# Patient Record
Sex: MALE | Race: BLACK OR AFRICAN AMERICAN
[De-identification: names, ages, dates, MRNs, and addresses within clinical notes are randomized per-mention and may not be internally consistent; named-entity substitution may affect disease eponyms.]

---

## 2018-11-01 ENCOUNTER — HOSPITAL ENCOUNTER (INPATIENT)
Dept: HOSPITAL 61 - ER | Age: 63
LOS: 6 days | Discharge: HOME | DRG: 552 | End: 2018-11-07
Attending: FAMILY MEDICINE | Admitting: FAMILY MEDICINE
Payer: COMMERCIAL

## 2018-11-01 VITALS — SYSTOLIC BLOOD PRESSURE: 156 MMHG | DIASTOLIC BLOOD PRESSURE: 94 MMHG

## 2018-11-01 VITALS — HEIGHT: 70 IN | WEIGHT: 215 LBS | BODY MASS INDEX: 30.78 KG/M2

## 2018-11-01 VITALS — DIASTOLIC BLOOD PRESSURE: 108 MMHG | SYSTOLIC BLOOD PRESSURE: 155 MMHG

## 2018-11-01 DIAGNOSIS — Z88.8: ICD-10-CM

## 2018-11-01 DIAGNOSIS — S76.012A: ICD-10-CM

## 2018-11-01 DIAGNOSIS — N28.1: ICD-10-CM

## 2018-11-01 DIAGNOSIS — M41.9: ICD-10-CM

## 2018-11-01 DIAGNOSIS — E78.5: ICD-10-CM

## 2018-11-01 DIAGNOSIS — K76.89: ICD-10-CM

## 2018-11-01 DIAGNOSIS — Z82.49: ICD-10-CM

## 2018-11-01 DIAGNOSIS — M47.26: Primary | ICD-10-CM

## 2018-11-01 DIAGNOSIS — N18.2: ICD-10-CM

## 2018-11-01 DIAGNOSIS — I12.9: ICD-10-CM

## 2018-11-01 DIAGNOSIS — M70.62: ICD-10-CM

## 2018-11-01 DIAGNOSIS — L10.0: ICD-10-CM

## 2018-11-01 LAB
APTT PPP: YELLOW S
BACTERIA #/AREA URNS HPF: (no result) /HPF
BILIRUB UR QL STRIP: NEGATIVE
FIBRINOGEN PPP-MCNC: CLEAR MG/DL
HYALINE CASTS #/AREA URNS LPF: (no result) /HPF
NITRITE UR QL STRIP: NEGATIVE
PH UR STRIP: 5.5 [PH]
PROT UR STRIP-MCNC: NEGATIVE MG/DL
RBC #/AREA URNS HPF: (no result) /HPF (ref 0–2)
SQUAMOUS #/AREA URNS LPF: (no result) /LPF
UROBILINOGEN UR-MCNC: 1 MG/DL
WBC #/AREA URNS HPF: (no result) /HPF (ref 0–4)

## 2018-11-01 PROCEDURE — 72148 MRI LUMBAR SPINE W/O DYE: CPT

## 2018-11-01 PROCEDURE — 74177 CT ABD & PELVIS W/CONTRAST: CPT

## 2018-11-01 PROCEDURE — 73502 X-RAY EXAM HIP UNI 2-3 VIEWS: CPT

## 2018-11-01 PROCEDURE — 81001 URINALYSIS AUTO W/SCOPE: CPT

## 2018-11-01 PROCEDURE — 96372 THER/PROPH/DIAG INJ SC/IM: CPT

## 2018-11-01 PROCEDURE — 85651 RBC SED RATE NONAUTOMATED: CPT

## 2018-11-01 PROCEDURE — 62323 NJX INTERLAMINAR LMBR/SAC: CPT

## 2018-11-01 PROCEDURE — 80048 BASIC METABOLIC PNL TOTAL CA: CPT

## 2018-11-01 PROCEDURE — 85025 COMPLETE CBC W/AUTO DIFF WBC: CPT

## 2018-11-01 PROCEDURE — 72100 X-RAY EXAM L-S SPINE 2/3 VWS: CPT

## 2018-11-01 PROCEDURE — 36415 COLL VENOUS BLD VENIPUNCTURE: CPT

## 2018-11-01 NOTE — PHYS DOC
Adult General


Chief Complaint


Chief Complaint:  BACK PAIN - NO INJURY





HPI


HPI





Patient is a 63  year old male who presents complaining of 10 out of 10 left 

groin pain radiating into the left lower back that began one week ago. Patient 

states the pain is worse on certain sitting positions and movement. Patient 

states he tried taking over-the-counter pain relievers and hydrocodone but did 

not obtain any relief so he stopped taking them. Patient denies any injury. 

Denies any loss of bowel /bladder function. Denies any urgency frequency 

dysuria or hematuria.





PCP Dr. Mannie Wesley.





Review of Systems


Review of Systems





Constitutional: Denies fever or chills []


Eyes: Denies change in visual acuity, redness, or eye pain []


HENT: Denies nasal congestion or sore throat []


Respiratory: Denies cough or shortness of breath []


Cardiovascular: No additional information not addressed in HPI []


GI: Denies abdominal pain, nausea, vomiting, bloody stools or diarrhea []


: Denies dysuria or hematuria []


Musculoskeletal: Reports left groin pain radiating into the left buttock


Integument: Denies rash or skin lesions []


Neurologic: Denies headache, focal weakness or sensory changes []








All other systems were reviewed and found to be within normal limits, except as 

documented in this note.





Current Medications


Current Medications





Current Medications








 Medications


  (Trade)  Dose


 Ordered  Sig/Lukasz  Start Time


 Stop Time Status Last Admin


Dose Admin


 


 Clonidine HCl


  (Catapres)  0.1 mg  Q6HRS  PRN  11/1/18 16:30


   UNV  





 


 Dexamethasone


 Sodium Phosphate


  (Decadron)  10 mg  1X  ONCE  11/1/18 14:15


 11/1/18 14:20 DC 11/1/18 14:29


10 MG


 


 Diazepam


  (Valium)  5 mg  1X  ONCE  11/1/18 14:15


 11/1/18 14:20 DC 11/1/18 14:34


5 MG


 


 Ketorolac


 Tromethamine


  (Toradol Im)  60 mg  1X  ONCE  11/1/18 14:15


 11/1/18 14:20 DC 11/1/18 14:31


60 MG


 


 Morphine Sulfate


  (Morphine


 Sulfate)  4 mg  PRN Q2HR  PRN  11/1/18 16:30


 11/2/18 16:29 UNV  





 


 Ondansetron HCl


  (Zofran)  4 mg  PRN Q8HRS  PRN  11/1/18 16:30


 11/2/18 16:29 UNV  














Allergies


Allergies





Allergies








Coded Allergies Type Severity Reaction Last Updated Verified


 


  lisinopril Allergy Severe SWELLING 11/1/18 Yes











Physical Exam


Physical Exam





Constitutional: Well developed, well nourished, no acute distress, non-toxic 

appearance. []


HENT: Normocephalic, atraumatic, bilateral external ears normal, oropharynx 

moist, no oral exudates, nose normal. []


Eyes: PERRLA, EOMI, conjunctiva normal, no discharge. [] 


Neck: Normal range of motion, no tenderness, supple, no stridor. [] 


Cardiovascular:Heart rate regular rhythm, no murmur []


Lungs & Thorax:  Bilateral breath sounds clear to auscultation []


Abdomen: Bowel sounds normal, soft, no tenderness, no masses, no pulsatile 

masses. [] 


Skin: Warm, dry, no erythema, no rash. [] 


Back: No tenderness, no CVA tenderness. [] 


Extremities: Tenderness on palpation of the left SI joint, no midline lumbar 

spine tenderness, no cyanosis, no clubbing, ROM intact, no edema. [] 


Neurologic: Alert and oriented X 3, normal motor function, normal sensory 

function, no focal deficits noted. []


Psychologic: Affect normal, judgement normal, mood normal. []





Current Patient Data


Vital Signs





 Vital Signs








  Date Time  Temp Pulse Resp B/P (MAP) Pulse Ox O2 Delivery O2 Flow Rate FiO2


 


11/1/18 15:58    154/87 (109)    


 


11/1/18 14:17 97.6 102 18  97 Room Air  





 97.6       











EKG


EKG


[]





Radiology/Procedures


Radiology/Procedures


[]PROCEDURE: LUMBAR SPINE 2-3V





LUMBAR SPINE 2-3V


 


History: LOWER BACK PAIN/ LEFT SIDED PELVIC PAIN.


 


Comparison: July 5, 2017


 


Right convexity thoracolumbar scoliosis is again identified. Appears 


similar to the previous study. Degenerative spondylosis with marginal 


spurring at multiple levels. This also appears similar to the previous 


exam. No significant spondylolisthesis. No evidence of vertebral body 


compression fracture


 


IMPRESSION: Lumbar spondylosis with scoliosis.


 


Electronically signed by: Ashok Cai MD (11/1/2018 3:14 PM) [facility]














DICTATED and SIGNED BY:     ASHOK CAI MD


DATE:     11/01/18 1510





Course & Med Decision Making


Course & Med Decision Making


Pertinent Labs and Imaging studies reviewed. (See chart for details)





This is a 63-year-old male patient presenting to the ED today with left groin 

pain radiating to the left buttock one week. Lumbar spine x-rays interpreted by 

radiologist were noted for spondylosis with the scoliosis, no acute findings. 

Patient was given morphine 10 mg subcutaneous, Valium, Toradol, Decadron, he 

states he has not obtained any relief, he is requesting to be admitted. He had 

a very hard time ambulating from wheel chair to cart. He was holding on 

anything he can to keep him up.  Blood pressure 201/106 in the ED with a heart 

rate of low 100s. Patient was given clonidine. Patient denies any cardiac or 

neurological symptoms. Has history of hypertension. Did not take his 

medications today.





Spoke with Dr. Waters who accepted patient for admission.





Dragon Disclaimer


Dragon Disclaimer


This electronic medical record was generated, in whole or in part, using a 

voice recognition dictation system.





Departure


Departure


Impression:  


 Primary Impression:  


 Intractable low back pain


 Additional Impression:  


 Accelerated hypertension


Disposition:  09 ADMITTED AS INPATIENT


Condition:  STABLE


Referrals:  


ALVIN JORGENSEN (PCP)





Problem Qualifiers











LUIS MARTIN APRN Nov 1, 2018 14:20

## 2018-11-01 NOTE — RAD
LUMBAR SPINE 2-3V

 

History: LOWER BACK PAIN/ LEFT SIDED PELVIC PAIN.

 

Comparison: July 5, 2017

 

Right convexity thoracolumbar scoliosis is again identified. Appears 

similar to the previous study. Degenerative spondylosis with marginal 

spurring at multiple levels. This also appears similar to the previous 

exam. No significant spondylolisthesis. No evidence of vertebral body 

compression fracture

 

IMPRESSION: Lumbar spondylosis with scoliosis.

 

Electronically signed by: Ashok Cai MD (11/1/2018 3:14 PM) [facility]

## 2018-11-02 VITALS — DIASTOLIC BLOOD PRESSURE: 106 MMHG | SYSTOLIC BLOOD PRESSURE: 170 MMHG

## 2018-11-02 VITALS — SYSTOLIC BLOOD PRESSURE: 160 MMHG | DIASTOLIC BLOOD PRESSURE: 98 MMHG

## 2018-11-02 VITALS — DIASTOLIC BLOOD PRESSURE: 97 MMHG | SYSTOLIC BLOOD PRESSURE: 150 MMHG

## 2018-11-02 VITALS — SYSTOLIC BLOOD PRESSURE: 161 MMHG | DIASTOLIC BLOOD PRESSURE: 99 MMHG

## 2018-11-02 VITALS — SYSTOLIC BLOOD PRESSURE: 146 MMHG | DIASTOLIC BLOOD PRESSURE: 98 MMHG

## 2018-11-02 VITALS — SYSTOLIC BLOOD PRESSURE: 156 MMHG | DIASTOLIC BLOOD PRESSURE: 96 MMHG

## 2018-11-02 LAB
ANION GAP SERPL CALC-SCNC: 12 MMOL/L (ref 6–14)
BASOPHILS # BLD AUTO: 0 X10^3/UL (ref 0–0.2)
BASOPHILS NFR BLD: 0 % (ref 0–3)
BUN SERPL-MCNC: 18 MG/DL (ref 8–26)
CALCIUM SERPL-MCNC: 8.8 MG/DL (ref 8.5–10.1)
CHLORIDE SERPL-SCNC: 103 MMOL/L (ref 98–107)
CO2 SERPL-SCNC: 24 MMOL/L (ref 21–32)
CREAT SERPL-MCNC: 1.3 MG/DL (ref 0.7–1.3)
EOSINOPHIL NFR BLD: 0 % (ref 0–3)
EOSINOPHIL NFR BLD: 0 X10^3/UL (ref 0–0.7)
ERYTHROCYTE [DISTWIDTH] IN BLOOD BY AUTOMATED COUNT: 14.9 % (ref 11.5–14.5)
GFR SERPLBLD BASED ON 1.73 SQ M-ARVRAT: 67.7 ML/MIN
GLUCOSE SERPL-MCNC: 112 MG/DL (ref 70–99)
HCT VFR BLD CALC: 43.2 % (ref 39–53)
HGB BLD-MCNC: 15.2 G/DL (ref 13–17.5)
LYMPHOCYTES # BLD: 1.1 X10^3/UL (ref 1–4.8)
LYMPHOCYTES NFR BLD AUTO: 17 % (ref 24–48)
MCH RBC QN AUTO: 30 PG (ref 25–35)
MCHC RBC AUTO-ENTMCNC: 35 G/DL (ref 31–37)
MCV RBC AUTO: 84 FL (ref 79–100)
MONO #: 0.5 X10^3/UL (ref 0–1.1)
MONOCYTES NFR BLD: 8 % (ref 0–9)
NEUT #: 4.9 X10^3UL (ref 1.8–7.7)
NEUTROPHILS NFR BLD AUTO: 75 % (ref 31–73)
PLATELET # BLD AUTO: 310 X10^3/UL (ref 140–400)
POTASSIUM SERPL-SCNC: 3.5 MMOL/L (ref 3.5–5.1)
RBC # BLD AUTO: 5.15 X10^6/UL (ref 4.3–5.7)
SODIUM SERPL-SCNC: 139 MMOL/L (ref 136–145)
WBC # BLD AUTO: 6.6 X10^3/UL (ref 4–11)

## 2018-11-02 RX ADMIN — LOSARTAN POTASSIUM SCH MG: 50 TABLET ORAL at 08:52

## 2018-11-02 RX ADMIN — HYDROCODONE BITARTRATE AND ACETAMINOPHEN PRN TAB: 5; 325 TABLET ORAL at 10:53

## 2018-11-02 RX ADMIN — HYDROCODONE BITARTRATE AND ACETAMINOPHEN PRN TAB: 5; 325 TABLET ORAL at 20:14

## 2018-11-02 RX ADMIN — KETOROLAC TROMETHAMINE PRN MG: 30 INJECTION, SOLUTION INTRAMUSCULAR at 08:52

## 2018-11-02 RX ADMIN — KETOROLAC TROMETHAMINE PRN MG: 30 INJECTION, SOLUTION INTRAMUSCULAR at 16:44

## 2018-11-02 NOTE — PDOC1
H & P


H&P


HPI:


Mr. Rouse is a 63-year-old male with past medical history of hypertension, 

hyperlipidemia, chronic kidney disease stage 2, pemphigus vulgaris, erectile 

dysfunction who presented to the ER yesterday for lower pelvic pain that 

radiates to the left buttock that is intractable and was unable to be 

controlled with pain medication in the emergency room. Of note, the ER 

performed only a lumbar spine x-ray, and did not evaluate anything related to 

his abdomen or pelvis. Labs including a UA were fairly unremarkable. He reports 

this pain has occurred before in 2015 and spontaneously resolved after a 

thorough workup that was unremarkable per his report. He reports this pain 

recurred approximately a week ago with increasing pain over the last couple of 

days. During his last episode he had been doing stretching exercises for 

possible psoas muscle spasm which had helped that time, but were unhelpful at 

this time. He denies fever, chills, nausea, vomiting, diarrhea, constipation, 

melena, hematochezia. He denies penile pain or testicular pain. He denies 

hernia now or in the past. He denies penile discharge, dysuria, frequency, 

urgency.





ROS:


Complete review systems is negative apart from otherwise stated above.





PMH: As above


Family Hx: Father had congestive heart failure, heart disease, MI, 

hypertension. Mother had congestive heart failure, diabetes, chronic kidney 

disease.


Social Hx: Nonsmoker, no significant alcohol use. No drug use.


Surg Hx: Tonsillectomy


Meds: Reviewed and reconciled


Allergies: Reviewed





PE:


Alert, oriented, moderate distress due to pain


EOMI, sclera non-icteric


Neck supple


RRR, no murmur


CTAB, no wheezes, crackles or rhonchi


Soft, NT, ND, normal bowel sounds, no rebound, guarding. Pain is reproduced 

with palpation of the left lower pelvic region and just superior to penis. No 

hernia appreciated.


No edema, cyanosis. Normal capillary refill.


Calm, cooperative, mood/affect within normal limits





Assessment/Plan:


Left lower pelvic pain


Hypertension


Hyperlipidemia


Chronic kidney disease stage II


Pemphigus vulgaris





Cancel lumbar spine MRI as ordered per ED.


Obtain CT abdomen and pelvis with contrast and urology consultation.


Home meds continued


Pain control











RAMIRO PATIÑO MD Nov 2, 2018 08:11

## 2018-11-02 NOTE — PDOC2
KATHERINMARCY MAGGIE APRN 11/2/18 1115:


UROLOGY CONSULT


Date of Consult


Date of Consult


DATE: 11/2/18 


TIME: 11:07





Reason for Consult


Reason for Consult:


Left groin pain radiating into pelvic/mons pubis area





Referring Physician


Referring Physician:


Dr. Waters





Identification/Chief Complaint


Chief Complaint


Left groin pain radiating into pelvic/mons pubis area





Source


Source:  Caregiver, Chart review, Patient





History of Present Illness


Reason for Visit:


Patient is a 63 year old male admitted for extreme pain in the left flank/groin 

area which radiates into his left hip, lower back area.  The pain is not over 

his kidneys at all, and  he denies any hematuria, dysuria LUTS/nocturia or 

history of prostate problems or cancer. He also denies a history of kidney 

problems, cancer or kidney stones ever.  He describers the pain as almost 

originating on the inside of his left thigh, near his pubic bone and radiating 

up into his hip joint. He denies any penile or scrotal pain or swelling and his 

urination is normal. He has been seen by Ortho in the past for this pain, but 

cannot remember what they told him. His wife is at bedside and they relate a 

history of a few different scans in the past two years that did not give them 

any definitive answers.  She has records of  a PSA less than 1 in the last year 

and is requesting FNP do a prostate exam for him.





Past Medical History


Musculoskeletal:   low back pain, Other (left hip pain, left groin pain. )


Renal/:  No pertinent hx





Current Medications


Current Medications





Current Medications


Acetaminophen/ Hydrocodone Bitart (Lortab 5/325) 1 tab PRN Q6HRS  PRN PO PAIN 

Last administered on 11/2/18at 10:53;  Start 11/2/18 at 08:45


Amlodipine Besylate (Norvasc) 5 mg DAILY PO  Last administered on 11/2/18at 08:

52;  Start 11/2/18 at 09:00


Clonidine HCl (Catapres) 0.1 mg PRN Q6HRS  PRN PO HYPERTENSION, SEE COMMENTS 

Last administered on 11/1/18at 22:48;  Start 11/1/18 at 16:30;  Stop 11/2/18 at 

08:41;  Status DC


Clonidine HCl (Catapres) 0.2 mg 1X  ONCE PO ;  Start 11/1/18 at 16:30;  Stop 11/ 1/18 at 16:39;  Status DC


Dexamethasone Sodium Phosphate (Decadron) 10 mg 1X  ONCE IM  Last administered 

on 11/1/18at 14:29;  Start 11/1/18 at 14:15;  Stop 11/1/18 at 14:20;  Status DC


Diazepam (Valium) 5 mg 1X  ONCE PO  Last administered on 11/1/18at 14:34;  

Start 11/1/18 at 14:15;  Stop 11/1/18 at 14:20;  Status DC


Hydrochlorothiazide (Microzide) 12.5 mg DAILY PO  Last administered on 11/2/ 18at 08:52;  Start 11/2/18 at 09:00


Info (CONTRAST GIVEN -- Rx MONITORING) 1 each PRN DAILY  PRN MC SEE COMMENTS;  

Start 11/2/18 at 08:45;  Stop 11/4/18 at 08:44


Iohexol (Omnipaque 240 Mg/ml) 30 ml 1X  ONCE PO  Last administered on 11/2/18at 

08:45;  Start 11/2/18 at 08:45;  Stop 11/2/18 at 08:46;  Status DC


Iohexol (Omnipaque 300 Mg/ml) 75 ml 1X  ONCE IV  Last administered on 11/2/18at 

08:45;  Start 11/2/18 at 08:45;  Stop 11/2/18 at 08:46;  Status DC


Ketorolac Tromethamine (Toradol 30mg Vial) 15 mg PRN Q6HRS  PRN IV PAIN Last 

administered on 11/2/18at 08:52;  Start 11/2/18 at 08:45;  Stop 11/7/18 at 08:44


Ketorolac Tromethamine (Toradol Im) 60 mg 1X  ONCE IM  Last administered on 11/1 /18at 14:31;  Start 11/1/18 at 14:15;  Stop 11/1/18 at 14:20;  Status DC


Losartan Potassium (Cozaar) 100 mg DAILY PO  Last administered on 11/2/18at 08:

52;  Start 11/2/18 at 09:00


Morphine Sulfate (Morphine Sulfate) 4 mg PRN Q2HR  PRN IV PAIN Last 

administered on 11/2/18at 06:49;  Start 11/1/18 at 16:30;  Stop 11/2/18 at 08:41

;  Status DC


Morphine Sulfate (Morphine Sulfate) 10 mg 1X  ONCE SQ  Last administered on 11/1 /18at 14:32;  Start 11/1/18 at 14:15;  Stop 11/1/18 at 14:20;  Status DC


Ondansetron HCl (Zofran) 4 mg PRN Q8HRS  PRN IV NAUSEA/VOMITING;  Start 11/1/18 

at 16:30;  Stop 11/2/18 at 16:29


Prednisone (Prednisone) 10 mg DAILY PO ;  Start 11/2/18 at 09:00





Allergies


Allergies:  


Coded Allergies:  


     lisinopril (Verified  Allergy, Severe, SWELLING, 11/1/18)





ROS


Review Of Systems:


CONSTITUTIONAL:        No fever or chills


EYES:                          No recent changes


SKIN:               No rash or itching


CARDIOVASCULAR:     No chest pain, syncope, palpitations, or edema


RESPIRATORY:            No SOB or cough


GASTROINTESTINAL:    No nausea, vomiting or abdominal pain


NEUROLOGICAL:          No headaches or weakness


ENDOCRINE:               No cold or heat intolerance


GENITOURINARY:         No urgency or frequency of urination, left going pain, 

no private area pain


MUSCULOSKELETAL:   + back pain, joint pain over left hip. 


LYMPHATICS:               No enlarged lymph nodes


PSYCHIATRIC:              No anxiety or depression





Physical Exam


Physical Exam:


General: Pleasant, no acute distress, well groomed


Eyes: conjunctiva anicteric, eyes full range of motion


ENT: moist oral mucosa, normal dentition


Neck: Trachea midline, no masses


Respiratory: unlabored breathing, not using accessory muscles


Abdomen: nontender, nondistended, no hepatosplenomegaly, no masses


NATALI: Prostate about 40 grams, WNL. 


Pelvic: scrotal WNL, non tender on exam.  scrotal contents WNL. Phallus WNL, No 

inguinal hernia





Vitals


VITALS





Vital Signs








  Date Time  Temp Pulse Resp B/P (MAP) Pulse Ox O2 Delivery O2 Flow Rate FiO2


 


11/2/18 10:53     96 Room Air  


 


11/2/18 08:52  77  156/96    


 


11/2/18 07:00 98.8  16     





 98.8       











Labs


Labs





Laboratory Tests








Test


 11/1/18


20:00 11/2/18


06:40


 


Urine Collection Type Unknown  


 


Urine Color Yellow  


 


Urine Clarity Clear  


 


Urine pH 5.5  


 


Urine Specific Gravity 1.015  


 


Urine Protein


 Negative mg/dL


(NEG-TRACE) 





 


Urine Glucose (UA)


 Negative mg/dL


(NEG) 





 


Urine Ketones (Stick) 40 mg/dL (NEG)  


 


Urine Blood Negative (NEG)  


 


Urine Nitrite Negative (NEG)  


 


Urine Bilirubin Negative (NEG)  


 


Urine Urobilinogen Dipstick


 1.0 mg/dL (0.2


mg/dL) 





 


Urine Leukocyte Esterase Negative (NEG)  


 


Urine RBC Occ /HPF (0-2)  


 


Urine WBC 1-4 /HPF (0-4)  


 


Urine Squamous Epithelial


Cells Few /LPF 


 





 


Urine Bacteria


 Few /HPF


(0-FEW) 





 


Urine Hyaline Casts Moderate /HPF  


 


Urine Mucus Marked /LPF  


 


White Blood Count


 


 6.6 x10^3/uL


(4.0-11.0)


 


Red Blood Count


 


 5.15 x10^6/uL


(4.30-5.70)


 


Hemoglobin


 


 15.2 g/dL


(13.0-17.5)


 


Hematocrit


 


 43.2 %


(39.0-53.0)


 


Mean Corpuscular Volume  84 fL () 


 


Mean Corpuscular Hemoglobin  30 pg (25-35) 


 


Mean Corpuscular Hemoglobin


Concent 


 35 g/dL


(31-37)


 


Red Cell Distribution Width


 


 14.9 %


(11.5-14.5)


 


Platelet Count


 


 310 x10^3/uL


(140-400)


 


Neutrophils (%) (Auto)  75 % (31-73) 


 


Lymphocytes (%) (Auto)  17 % (24-48) 


 


Monocytes (%) (Auto)  8 % (0-9) 


 


Eosinophils (%) (Auto)  0 % (0-3) 


 


Basophils (%) (Auto)  0 % (0-3) 


 


Neutrophils # (Auto)


 


 4.9 x10^3uL


(1.8-7.7)


 


Lymphocytes # (Auto)


 


 1.1 x10^3/uL


(1.0-4.8)


 


Monocytes # (Auto)


 


 0.5 x10^3/uL


(0.0-1.1)


 


Eosinophils # (Auto)


 


 0.0 x10^3/uL


(0.0-0.7)


 


Basophils # (Auto)


 


 0.0 x10^3/uL


(0.0-0.2)


 


Sodium Level


 


 139 mmol/L


(136-145)


 


Potassium Level


 


 3.5 mmol/L


(3.5-5.1)


 


Chloride Level


 


 103 mmol/L


()


 


Carbon Dioxide Level


 


 24 mmol/L


(21-32)


 


Anion Gap  12 (6-14) 


 


Blood Urea Nitrogen


 


 18 mg/dL


(8-26)


 


Creatinine


 


 1.3 mg/dL


(0.7-1.3)


 


Estimated GFR


(Cockcroft-Gault) 


 67.7 





 


Glucose Level


 


 112 mg/dL


(70-99)


 


Calcium Level


 


 8.8 mg/dL


(8.5-10.1)








Laboratory Tests








Test


 11/1/18


20:00 11/2/18


06:40


 


Urine Collection Type Unknown  


 


Urine Color Yellow  


 


Urine Clarity Clear  


 


Urine pH 5.5  


 


Urine Specific Gravity 1.015  


 


Urine Protein


 Negative mg/dL


(NEG-TRACE) 





 


Urine Glucose (UA)


 Negative mg/dL


(NEG) 





 


Urine Ketones (Stick) 40 mg/dL (NEG)  


 


Urine Blood Negative (NEG)  


 


Urine Nitrite Negative (NEG)  


 


Urine Bilirubin Negative (NEG)  


 


Urine Urobilinogen Dipstick


 1.0 mg/dL (0.2


mg/dL) 





 


Urine Leukocyte Esterase Negative (NEG)  


 


Urine RBC Occ /HPF (0-2)  


 


Urine WBC 1-4 /HPF (0-4)  


 


Urine Squamous Epithelial


Cells Few /LPF 


 





 


Urine Bacteria


 Few /HPF


(0-FEW) 





 


Urine Hyaline Casts Moderate /HPF  


 


Urine Mucus Marked /LPF  


 


White Blood Count


 


 6.6 x10^3/uL


(4.0-11.0)


 


Red Blood Count


 


 5.15 x10^6/uL


(4.30-5.70)


 


Hemoglobin


 


 15.2 g/dL


(13.0-17.5)


 


Hematocrit


 


 43.2 %


(39.0-53.0)


 


Mean Corpuscular Volume  84 fL () 


 


Mean Corpuscular Hemoglobin  30 pg (25-35) 


 


Mean Corpuscular Hemoglobin


Concent 


 35 g/dL


(31-37)


 


Red Cell Distribution Width


 


 14.9 %


(11.5-14.5)


 


Platelet Count


 


 310 x10^3/uL


(140-400)


 


Neutrophils (%) (Auto)  75 % (31-73) 


 


Lymphocytes (%) (Auto)  17 % (24-48) 


 


Monocytes (%) (Auto)  8 % (0-9) 


 


Eosinophils (%) (Auto)  0 % (0-3) 


 


Basophils (%) (Auto)  0 % (0-3) 


 


Neutrophils # (Auto)


 


 4.9 x10^3uL


(1.8-7.7)


 


Lymphocytes # (Auto)


 


 1.1 x10^3/uL


(1.0-4.8)


 


Monocytes # (Auto)


 


 0.5 x10^3/uL


(0.0-1.1)


 


Eosinophils # (Auto)


 


 0.0 x10^3/uL


(0.0-0.7)


 


Basophils # (Auto)


 


 0.0 x10^3/uL


(0.0-0.2)


 


Sodium Level


 


 139 mmol/L


(136-145)


 


Potassium Level


 


 3.5 mmol/L


(3.5-5.1)


 


Chloride Level


 


 103 mmol/L


()


 


Carbon Dioxide Level


 


 24 mmol/L


(21-32)


 


Anion Gap  12 (6-14) 


 


Blood Urea Nitrogen


 


 18 mg/dL


(8-26)


 


Creatinine


 


 1.3 mg/dL


(0.7-1.3)


 


Estimated GFR


(Cockcroft-Gault) 


 67.7 





 


Glucose Level


 


 112 mg/dL


(70-99)


 


Calcium Level


 


 8.8 mg/dL


(8.5-10.1)











Assessment/Plan


Assessment/Plan


PSA less than 1 per record patient brought in, NATALI WNL and not overly 

uncomfortable during the process per patient report. 


A CT AB/PELVIS has been done on patient but read is not back yet.  Await 

official read from Radiology. 


WBC 6.6, 


CRT 1.3, BUN 18


Urine shows few bacteria with no blood, leukocytes or nitrites.  Low suspicion 

for infection. 


Pt afebrile





LUPE ÁLVAREZ MD 11/3/18 1406:


UROLOGY CONSULT


Assessment/Plan


Assessment/Plan


Debilitating pinpoint tenderness superior and left lateral to the base of his 

penis that radiates to the left hip.  Chronic in nature however, acutely worse 

for the past 8 days.  Sx come and go.  Finds relief with abducting his left 

thigh.  UA normal.  No urinary tract pathology on CT. 


Exam and history are not consistent with pain from  pathology.  Likely 

musculoskeletal in nature.  May benefit from pelvic MRI and/or pain management 

service for consideration of regional nerve block.


Will sign-off.  Please call with questions.











MARCY PANDEY Nov 2, 2018 11:15


LUPE ÁLVAREZ MD Nov 3, 2018 14:06

## 2018-11-03 VITALS — DIASTOLIC BLOOD PRESSURE: 59 MMHG | SYSTOLIC BLOOD PRESSURE: 161 MMHG

## 2018-11-03 VITALS — DIASTOLIC BLOOD PRESSURE: 97 MMHG | SYSTOLIC BLOOD PRESSURE: 142 MMHG

## 2018-11-03 VITALS — SYSTOLIC BLOOD PRESSURE: 165 MMHG | DIASTOLIC BLOOD PRESSURE: 107 MMHG

## 2018-11-03 VITALS — SYSTOLIC BLOOD PRESSURE: 146 MMHG | DIASTOLIC BLOOD PRESSURE: 103 MMHG

## 2018-11-03 VITALS — DIASTOLIC BLOOD PRESSURE: 99 MMHG | SYSTOLIC BLOOD PRESSURE: 157 MMHG

## 2018-11-03 VITALS — SYSTOLIC BLOOD PRESSURE: 159 MMHG | DIASTOLIC BLOOD PRESSURE: 102 MMHG

## 2018-11-03 RX ADMIN — LOSARTAN POTASSIUM SCH MG: 50 TABLET ORAL at 08:36

## 2018-11-03 RX ADMIN — HYDROCODONE BITARTRATE AND ACETAMINOPHEN PRN TAB: 5; 325 TABLET ORAL at 08:37

## 2018-11-03 RX ADMIN — KETOROLAC TROMETHAMINE PRN MG: 30 INJECTION, SOLUTION INTRAMUSCULAR at 13:54

## 2018-11-03 RX ADMIN — HYDROCODONE BITARTRATE AND ACETAMINOPHEN PRN TAB: 5; 325 TABLET ORAL at 19:23

## 2018-11-04 VITALS — DIASTOLIC BLOOD PRESSURE: 101 MMHG | SYSTOLIC BLOOD PRESSURE: 152 MMHG

## 2018-11-04 VITALS — SYSTOLIC BLOOD PRESSURE: 171 MMHG | DIASTOLIC BLOOD PRESSURE: 110 MMHG

## 2018-11-04 VITALS — SYSTOLIC BLOOD PRESSURE: 171 MMHG | DIASTOLIC BLOOD PRESSURE: 97 MMHG

## 2018-11-04 VITALS — SYSTOLIC BLOOD PRESSURE: 158 MMHG | DIASTOLIC BLOOD PRESSURE: 99 MMHG

## 2018-11-04 VITALS — SYSTOLIC BLOOD PRESSURE: 162 MMHG | DIASTOLIC BLOOD PRESSURE: 99 MMHG

## 2018-11-04 VITALS — DIASTOLIC BLOOD PRESSURE: 117 MMHG | SYSTOLIC BLOOD PRESSURE: 186 MMHG

## 2018-11-04 RX ADMIN — CELECOXIB SCH MG: 100 CAPSULE ORAL at 11:53

## 2018-11-04 RX ADMIN — CELECOXIB SCH MG: 100 CAPSULE ORAL at 20:20

## 2018-11-04 RX ADMIN — LOSARTAN POTASSIUM SCH MG: 50 TABLET ORAL at 09:35

## 2018-11-04 RX ADMIN — KETOROLAC TROMETHAMINE PRN MG: 30 INJECTION, SOLUTION INTRAMUSCULAR at 01:43

## 2018-11-05 VITALS — DIASTOLIC BLOOD PRESSURE: 98 MMHG | SYSTOLIC BLOOD PRESSURE: 170 MMHG

## 2018-11-05 VITALS — DIASTOLIC BLOOD PRESSURE: 101 MMHG | SYSTOLIC BLOOD PRESSURE: 160 MMHG

## 2018-11-05 VITALS — SYSTOLIC BLOOD PRESSURE: 174 MMHG | DIASTOLIC BLOOD PRESSURE: 89 MMHG

## 2018-11-05 VITALS — SYSTOLIC BLOOD PRESSURE: 158 MMHG | DIASTOLIC BLOOD PRESSURE: 97 MMHG

## 2018-11-05 VITALS — SYSTOLIC BLOOD PRESSURE: 173 MMHG | DIASTOLIC BLOOD PRESSURE: 99 MMHG

## 2018-11-05 VITALS — DIASTOLIC BLOOD PRESSURE: 95 MMHG | SYSTOLIC BLOOD PRESSURE: 163 MMHG

## 2018-11-05 VITALS — SYSTOLIC BLOOD PRESSURE: 195 MMHG | DIASTOLIC BLOOD PRESSURE: 109 MMHG

## 2018-11-05 RX ADMIN — LIDOCAINE SCH PATCH: 50 PATCH CUTANEOUS at 18:10

## 2018-11-05 RX ADMIN — GABAPENTIN SCH MG: 100 CAPSULE ORAL at 14:32

## 2018-11-05 RX ADMIN — SENNOSIDES AND DOCUSATE SODIUM SCH TAB: 8.6; 5 TABLET ORAL at 21:00

## 2018-11-05 RX ADMIN — CELECOXIB SCH MG: 100 CAPSULE ORAL at 08:16

## 2018-11-05 RX ADMIN — LOSARTAN POTASSIUM SCH MG: 50 TABLET ORAL at 08:18

## 2018-11-05 RX ADMIN — GABAPENTIN SCH MG: 100 CAPSULE ORAL at 11:05

## 2018-11-05 NOTE — CONS
DATE OF CONSULTATION:  11/05/2018



I saw him at the request of Dr. Brennan on 11/05/2018.



ATTENDING PHYSICIAN:  Dr. Waters.



LOCATION:  He is in room 569.



HISTORY OF PRESENT ILLNESS:  This is a 63-year-old retired .  The

patient with history of hypertension, hyperlipidemia, chronic kidney disease

stage 2, pemphigus vulgaris, erectile dysfunction, admitted through the

Emergency Room on 11/03/2018 for left pelvic area pain radiating to his left

buttock and to his lower back, unable to control with pain medication.  He had

x-rays of lumbar spine in the Emergency Room, which revealed mild spondylosis

and scoliosis.  The patient had CT scan of the pelvis, which revealed probable

small hepatic cyst, small left renal cyst, minimal sigmoid diverticulosis, small

fat containing umbilical hernia.  No acute abdominal or pelvic abnormality was

noted.  It revealed moderate thoracic-lumbar scoliosis with moderate multilevel

degenerative changes.  The patient admits that pain started about 10-11 days ago

without any specific injury and is not getting any better.  The patient denies

any specific injury.  He denies any radiation of pain to the extremities or any

tingling and numbness sensation in the extremities or any weakness and also

denies any trouble with his bowel or bladder control.



FAMILY HISTORY:  Congestive heart failure with father, heart disease, myocardial

infarction, hypertension.  Mother had congestive heart failure, diabetes and

chronic kidney disease.



PAST SURGICAL HISTORY:  The patient is status post tonsillectomy.



ALLERGIES:  HE IS KNOWN ALLERGIC TO LISINOPRIL.



PHYSICAL EXAMINATION:  Today revealed a middle-aged male.  He is alert, in

moderate distress.  He is protecting his left hip pelvic area.  He had pain free

range of motion of left hip joint.  He had tenderness to palpation over left

sacroiliac joint area, gluteal muscles and trochanteric bursa.  Straight leg

raising test is negative bilaterally.  He is having pain while rolling from side

to side.  The patient had equal perception of touch and pinprick sensation

bilaterally.  He had 5/5 grade muscle strength overall.  Deep tendon reflexes

are 2+ and symmetrical.  He had multiple skin lesions from his pemphigus

vulgaris.



ASSESSMENT:  Left buttock and sacroiliac joint area pain, most probably from

degenerative disk disease.  No clinical evidence of lumbar radiculopathy.  Also,

present with left gluteal muscle strain and left trochanteric bursitis.



RECOMMENDATIONS:  To proceed with injecting painful left sacroiliac joint area

and left trochanteric bursa and to obtain MRI scan of his lumbar vertebrae.  To

ask Physical Therapy to try physical modalities and home when medically stable

with outpatient followup.  To start him back on prednisone by mouth.



Dr. Brennan, I appreciate asking me to participate in the care of this interesting

patient.  I will be glad to follow him with you as needed for the

rehabilitation.

 



______________________________

BRYANNA UGTIÉRREZ MD



DR:  ESTELITA/nts  JOB#:  [id number] / [id number]

DD:  11/05/2018 08:05  DT:  11/05/2018 12:28

## 2018-11-05 NOTE — RAD
Pelvis with left hip, 3 views, 11/5/2018:

 

HISTORY: Hip pain

 

No fracture or dislocation is identified. The hip joints are 

well-maintained. Degenerative changes are evident in the lumbar spine. 

There is contrast material in the colon from a recent CT study.

 

IMPRESSION: No significant left hip abnormality is detected.

 

Electronically signed by: Rick Moritz, MD (11/5/2018 12:54 PM) [facility]

## 2018-11-05 NOTE — RAD
MRI of the lumbar spine without contrast 11/5/2018

 

CLINICAL HISTORY: Low back pain with bilateral leg weakness for 9 days.

 

TECHNIQUE: Unenhanced T1-weighted and T2-weighted sagittal and axial and 

inversion recovery sagittal images of the lumbar spine were obtained.

 

FINDINGS: Comparison is made to radiographs of the lumbar spine dated 

11/1/2018.

 

Moderate S-shaped curvature of the thoracolumbar spine is seen. 

Degenerative signal changes are seen involving the L1-2, L2-3, L3-4 and 

L4-5 discs. Degenerative signal changes are seen within the marrow 

surrounding these discs. The conus medullaris is normal in position and 

signal characteristics. Rounded high signal intensity lesions are seen on 

the T2-weighted images involving both kidneys, left greater than right. 

These measure 3 mm in 2.4 cm in size. They likely represent cysts.

 

At the L1-2 disc space there is a mild generalized disc bulge. This is 

eccentric to the left. Degenerative changes are seen involving the facet 

joints bilaterally. There is mild ligamentum flavum hypertrophy 

bilaterally. These findings when combine result in mild left lateral 

central spinal canal stenosis. Mild to moderate left neural foraminal 

stenosis is seen. The right neural foramen is patent.

 

At the L2-3 disc space there is a mild to moderate generalized disc bulge.

This is eccentric to the left. Degenerative changes are seen involving the

facet joints bilaterally. There is mild ligamentum flavum hypertrophy 

bilaterally. These findings when combined result in mild left-sided 

central spinal canal stenosis. Moderate to severe left neural foraminal 

stenosis is seen. Mild right neural foraminal stenosis is noted.

 

At the L3-4 disc space there is a mild generalized disc bulge. This is 

eccentric to the right. Degenerative changes are seen involving the facet 

joints bilaterally. There is mild ligamentum flavum hypertrophy 

bilaterally. These findings do not result in significant central spinal 

canal stenosis. Mild right neural foraminal stenosis is seen. The left 

neural foramen is patent.

 

At the L4-5 disc space there is a mild to moderate generalized disc bulge.

This is eccentric to the right. Degenerative changes are seen involving 

the facet joints bilaterally. Small right facet joint effusion is seen. 

There is mild to moderate ligamentum flavum hypertrophy bilaterally. These

findings do not result in significant central spinal canal stenosis. Mild 

to moderate right neural foraminal stenosis is seen. The left neural 

foramen is patent.

 

At the L5-S1 disc space there is a minimal generalized disc bulge. 

Degenerative changes are seen involving the facet joints bilaterally. 

These findings do not result in significant central spinal canal or neural

foraminal stenosis.

 

IMPRESSION: The changes of degenerative disc disease are seen throughout 

the lumbar spine. These findings result in mild left lateral central 

spinal canal stenosis at L1-2 and mild left-sided central spinal canal 

stenosis at L2-3. Multilevel neural foraminal stenosis of varying severity

is seen as outlined above.

 

Electronically signed by: Ahmet Sotelo MD (11/5/2018 12:22 PM) [facility]

## 2018-11-06 VITALS — DIASTOLIC BLOOD PRESSURE: 100 MMHG | SYSTOLIC BLOOD PRESSURE: 184 MMHG

## 2018-11-06 VITALS — DIASTOLIC BLOOD PRESSURE: 87 MMHG | SYSTOLIC BLOOD PRESSURE: 151 MMHG

## 2018-11-06 VITALS — DIASTOLIC BLOOD PRESSURE: 94 MMHG | SYSTOLIC BLOOD PRESSURE: 152 MMHG

## 2018-11-06 VITALS — SYSTOLIC BLOOD PRESSURE: 187 MMHG | DIASTOLIC BLOOD PRESSURE: 89 MMHG

## 2018-11-06 VITALS — DIASTOLIC BLOOD PRESSURE: 111 MMHG | SYSTOLIC BLOOD PRESSURE: 176 MMHG

## 2018-11-06 VITALS — DIASTOLIC BLOOD PRESSURE: 104 MMHG | SYSTOLIC BLOOD PRESSURE: 170 MMHG

## 2018-11-06 VITALS — DIASTOLIC BLOOD PRESSURE: 95 MMHG | SYSTOLIC BLOOD PRESSURE: 183 MMHG

## 2018-11-06 PROCEDURE — 3E0R33Z INTRODUCTION OF ANTI-INFLAMMATORY INTO SPINAL CANAL, PERCUTANEOUS APPROACH: ICD-10-PCS | Performed by: FAMILY MEDICINE

## 2018-11-06 PROCEDURE — 3E0R3BZ INTRODUCTION OF ANESTHETIC AGENT INTO SPINAL CANAL, PERCUTANEOUS APPROACH: ICD-10-PCS | Performed by: FAMILY MEDICINE

## 2018-11-06 RX ADMIN — LOSARTAN POTASSIUM SCH MG: 50 TABLET ORAL at 08:45

## 2018-11-06 RX ADMIN — SENNOSIDES AND DOCUSATE SODIUM SCH TAB: 8.6; 5 TABLET ORAL at 21:00

## 2018-11-06 RX ADMIN — Medication SCH EA: at 08:45

## 2018-11-06 RX ADMIN — GABAPENTIN SCH MG: 100 CAPSULE ORAL at 08:45

## 2018-11-06 RX ADMIN — GABAPENTIN SCH MG: 100 CAPSULE ORAL at 15:03

## 2018-11-06 RX ADMIN — GABAPENTIN SCH MG: 100 CAPSULE ORAL at 21:00

## 2018-11-06 RX ADMIN — LIDOCAINE SCH PATCH: 50 PATCH CUTANEOUS at 21:00

## 2018-11-06 RX ADMIN — SENNOSIDES AND DOCUSATE SODIUM SCH TAB: 8.6; 5 TABLET ORAL at 08:44

## 2018-11-06 NOTE — PDOC
PROGRESS NOTES


Subjective


Subjective


He admits continued back and left hip pain with movement but slept better last 

evening.





Objective


Objective





Vital Signs








  Date Time  Temp Pulse Resp B/P (MAP) Pulse Ox O2 Delivery O2 Flow Rate FiO2


 


11/6/18 11:09 98.7 86 18 176/111 (132) 94 Room Air  





 98.7       














Intake and Output 


 


 11/6/18





 07:00


 


Intake Total 900 ml


 


Output Total 0 ml


 


Balance 900 ml


 


 


 


Intake Oral 900 ml


 


Output Urine Total 0 ml


 


# Voids 3











Physical Exam


Physical Exam


He is alert and lying on his left side and does not seem to be in any 

significant distress.





Assessment


Assessment


Problems


Medical Problems:


(1) Accelerated hypertension


Status: Acute  





(2) Intractable low back pain


Status: Acute  











Plan


Plan of Care


To proceed with lumbar epidural steroid injections and home when he can get 

around with less pain.





Comment


Review of Relevant


I have reviewed the following items isabel (where applicable) has been applied.


Labs





Laboratory Tests








Test


 11/5/18


09:30


 


Erythrocyte Sedimentation Rate 16 (0-15) 








Medications





Current Medications


Morphine Sulfate (Morphine Sulfate) 10 mg 1X  ONCE SQ  Last administered on 11/1 /18at 14:32;  Start 11/1/18 at 14:15;  Stop 11/1/18 at 14:20;  Status DC


Diazepam (Valium) 5 mg 1X  ONCE PO  Last administered on 11/1/18at 14:34;  

Start 11/1/18 at 14:15;  Stop 11/1/18 at 14:20;  Status DC


Ketorolac Tromethamine (Toradol Im) 60 mg 1X  ONCE IM  Last administered on 11/1 /18at 14:31;  Start 11/1/18 at 14:15;  Stop 11/1/18 at 14:20;  Status DC


Dexamethasone Sodium Phosphate (Decadron) 10 mg 1X  ONCE IM  Last administered 

on 11/1/18at 14:29;  Start 11/1/18 at 14:15;  Stop 11/1/18 at 14:20;  Status DC


Ondansetron HCl (Zofran) 4 mg PRN Q8HRS  PRN IV NAUSEA/VOMITING;  Start 11/1/18 

at 16:30;  Stop 11/2/18 at 16:29;  Status DC


Morphine Sulfate (Morphine Sulfate) 4 mg PRN Q2HR  PRN IV PAIN Last 

administered on 11/2/18at 06:49;  Start 11/1/18 at 16:30;  Stop 11/2/18 at 08:41

;  Status DC


Clonidine HCl (Catapres) 0.2 mg 1X  ONCE PO ;  Start 11/1/18 at 16:30;  Stop 11/ 1/18 at 16:39;  Status DC


Clonidine HCl (Catapres) 0.1 mg PRN Q6HRS  PRN PO HYPERTENSION, SEE COMMENTS 

Last administered on 11/1/18at 22:48;  Start 11/1/18 at 16:30;  Stop 11/2/18 at 

08:41;  Status DC


Iohexol (Omnipaque 240 Mg/ml) 30 ml 1X  ONCE PO  Last administered on 11/2/18at 

08:45;  Start 11/2/18 at 08:45;  Stop 11/2/18 at 08:46;  Status DC


Iohexol (Omnipaque 300 Mg/ml) 75 ml 1X  ONCE IV  Last administered on 11/2/18at 

08:45;  Start 11/2/18 at 08:45;  Stop 11/2/18 at 08:46;  Status DC


Amlodipine Besylate (Norvasc) 5 mg DAILY PO  Last administered on 11/5/18at 08:

17;  Start 11/2/18 at 09:00;  Stop 11/6/18 at 08:34;  Status DC


Acetaminophen/ Hydrocodone Bitart (Lortab 5/325) 1 tab PRN Q6HRS  PRN PO PAIN 

Last administered on 11/3/18at 19:23;  Start 11/2/18 at 08:45;  Stop 11/5/18 at 

17:11;  Status DC


Prednisone (Prednisone) 10 mg DAILY PO  Last administered on 11/4/18at 09:37;  

Start 11/2/18 at 09:00;  Stop 11/4/18 at 15:51;  Status DC


Losartan Potassium (Cozaar) 100 mg DAILY PO  Last administered on 11/6/18at 08:

45;  Start 11/2/18 at 09:00


Info (CONTRAST GIVEN -- Rx MONITORING) 1 each PRN DAILY  PRN MC SEE COMMENTS;  

Start 11/2/18 at 08:45;  Stop 11/4/18 at 08:44;  Status DC


Hydrochlorothiazide (Microzide) 12.5 mg DAILY PO  Last administered on 11/4/ 18at 09:36;  Start 11/2/18 at 09:00;  Stop 11/4/18 at 15:51;  Status DC


Ketorolac Tromethamine (Toradol 30mg Vial) 15 mg PRN Q6HRS  PRN IV PAIN Last 

administered on 11/4/18at 01:43;  Start 11/2/18 at 08:45;  Stop 11/7/18 at 08:44


Methylprednisolone Sodium Succinate (SOLU-Medrol 125MG VIAL) 125 mg 1X  ONCE IV

  Last administered on 11/4/18at 11:53;  Start 11/4/18 at 12:15;  Stop 11/4/18 

at 12:16;  Status DC


Celecoxib (CeleBREX) 200 mg BID PO  Last administered on 11/5/18at 08:16;  

Start 11/4/18 at 12:00;  Stop 11/5/18 at 17:11;  Status DC


Methylprednisolone Acetate (DEPO-Medrol 40MG VIAL) 40 mg 1X  ONCE IM ;  Start 11 /5/18 at 07:45;  Stop 11/5/18 at 08:05;  Status DC


Bupivacaine HCl (Sensorcaine-Mpf 0.25%) 10 ml 1X  ONCE IJ ;  Start 11/5/18 at 07

:45;  Stop 11/5/18 at 08:05;  Status DC


Methylprednisolone Acetate (DEPO-Medrol 40MG VIAL) 40 mg 1X  ONCE IM ;  Start 11 /5/18 at 07:45;  Stop 11/5/18 at 08:05;  Status DC


Prednisone (Prednisone) 10 mg DAILY PO  Last administered on 11/5/18at 08:16;  

Start 11/5/18 at 09:00;  Stop 11/5/18 at 09:00;  Status DC


Gabapentin (Neurontin) 200 mg TID PO  Last administered on 11/5/18at 14:32;  

Start 11/5/18 at 09:00;  Stop 11/5/18 at 17:11;  Status DC


Oxycodone/ Acetaminophen (Percocet 10/325) 1 tab PRN Q6HRS  PRN PO PAIN Last 

administered on 11/5/18at 18:09;  Start 11/5/18 at 17:15


Lidocaine (Lidoderm) 2 patch QHS TD  Last administered on 11/5/18at 18:10;  

Start 11/5/18 at 18:00


Bisacodyl (Dulcolax Tab) 10 mg PRN DAILY  PRN PO CONSTIPATION;  Start 11/5/18 

at 17:15


Magnesium Hydroxide (Milk Of Magnesia) 2,400 mg PRN DAILY  PRN PO CONSTIPATION 

Last administered on 11/6/18at 08:44;  Start 11/5/18 at 17:15


Senna/Docusate Sodium (Senna Plus) 1 tab BID PO  Last administered on 11/6/18at 

08:44;  Start 11/5/18 at 21:00


Miscellaneous (Lidoderm Patch Removal) 1 ea DAILY MC  Last administered on 11/6/ 18at 08:45;  Start 11/6/18 at 09:00


Gabapentin (Neurontin) 200 mg TID PO ;  Start 11/6/18 at 09:00


Amlodipine Besylate (Norvasc) 10 mg DAILY PO  Last administered on 11/6/18at 08:

44;  Start 11/6/18 at 09:00


Methylprednisolone Acetate (DEPO-Medrol 40MG VIAL) 40 mg STK-MED ONCE .ROUTE ;  

Start 11/6/18 at 10:30;  Stop 11/6/18 at 10:31;  Status DC


Iohexol (Omnipaque 180 Mg/ml) 10 ml STK-MED ONCE .ROUTE ;  Start 11/6/18 at 10:

30;  Stop 11/6/18 at 10:31;  Status DC


Methylprednisolone Acetate (DEPO-Medrol 80MG VIAL) 80 mg STK-MED ONCE .ROUTE ;  

Start 11/6/18 at 10:30;  Stop 11/6/18 at 10:31;  Status DC





Active Scripts


Active


Reported


Hydrocodone-Apap 5-325  ** (Hydrocodone Bit/Acetaminophen) 1 Each Tablet 1 Tab 

PO PRN Q6HRS PRN


Prednisone 20 Mg Tablet 0.5 Tab PO DAILY


Amlodipine Besylate 5 Mg Tablet 5 Mg PO DAILY


Losartan-Hctz 100-12.5 Mg Tab (Losartan/Hydrochlorothiazide) 1 Each Tablet 1 

Each PO DAILY


Vitals/I & O





Vital Sign - Last 24 Hours








 11/5/18 11/5/18 11/5/18 11/5/18





 15:00 18:09 19:00 19:19


 


Temp 99.1  98.3 





 99.1  98.3 


 


Pulse 86  76 


 


Resp 16  17 


 


B/P (MAP) 160/101 (120)  173/99 (123) 


 


Pulse Ox 93  94 


 


O2 Delivery Room Air Room Air Room Air Room Air


 


    





    





 11/5/18 11/5/18 11/6/18 11/6/18





 20:20 23:00 03:00 07:00


 


Temp  98.1 98.0 97.8





  98.1 98.0 97.8


 


Pulse  72 76 81


 


Resp  16 16 20


 


B/P (MAP)  158/97 (117) 184/100 (128) 183/95 (124)


 


Pulse Ox  97 93 96


 


O2 Delivery Room Air Room Air Room Air Room Air


 


    





    





 11/6/18 11/6/18 11/6/18 11/6/18





 08:08 08:44 08:45 10:51


 


Temp    97.8





    97.8


 


Pulse  81 81 76


 


Resp    20


 


B/P (MAP)  183/95 183/95 187/89 (121)


 


Pulse Ox    93


 


O2 Delivery Room Air   Room Air


 


    





    





 11/6/18   





 11:09   


 


Temp 98.7   





 98.7   


 


Pulse 86   


 


Resp 18   


 


B/P (MAP) 176/111 (132)   


 


Pulse Ox 94   


 


O2 Delivery Room Air   














Intake and Output   


 


 11/5/18 11/5/18 11/6/18





 15:00 23:00 07:00


 


Intake Total 600 ml 300 ml 


 


Output Total   0 ml


 


Balance 600 ml 300 ml 0 ml

















BRYANNA GUTIÉRREZ MD Nov 6, 2018 12:57

## 2018-11-06 NOTE — PN
DATE:  11/05/2018



The patient continues to have intractable pain in the left lower back and

sacroiliac type area radiating to what appears to be toward the hip and the

anterior pelvic area.  He is not particularly tender over the symphysis and the

hip range of motion is normal without pain.  He is mildly tender over the

greater trochanteric bursa, but he is able to lie on that and he is more tender

in the sacroiliac joint area.  His symptoms do not radiate down the leg and I

believe is more likely to be something like an L3 or L4 radiculopathy than a

pelvic or hip problem.  We will go ahead and x-ray  the pelvis and hip and

concur with the MRI that Dr. Madrid has ordered.  We will do a sed rate as well

and a trial of gabapentin to see if we get any pain relief as Celebrex and

steroids have not helped at all so far.  Continues to have intractable pain and

not able to bear weight and thus needs to be in the hospital still.

 



______________________________

BRENDON SIFUENTES MD



DR:  [email or web address]  JOB#:  [id number] / [id number]

DD:  11/05/2018 08:46  DT:  11/06/2018 01:06

## 2018-11-06 NOTE — PDOC
SUBJECTIVE


Subjective


left pelvic pain





OBJECTIVE


Objective


62yo male C/O low back and left groin pain X 1 week, worse x 2 days with wt. 

bearing


Vital Signs





Vital Signs








  Date Time  Temp Pulse Resp B/P (MAP) Pulse Ox O2 Delivery O2 Flow Rate FiO2


 


11/6/18 11:09 98.7 86 18 176/111 (132) 94 Room Air  





 98.7       


 


11/6/18 10:51 97.8 76 20 187/89 (121) 93 Room Air  





 97.8       


 


11/6/18 08:45  81  183/95    


 


11/6/18 08:44  81  183/95    


 


11/6/18 08:08      Room Air  


 


11/6/18 07:00 97.8 81 20 183/95 (124) 96 Room Air  





 97.8       


 


11/6/18 03:00 98.0 76 16 184/100 (128) 93 Room Air  





 98.0       


 


11/5/18 23:00 98.1 72 16 158/97 (117) 97 Room Air  





 98.1       


 


11/5/18 20:20      Room Air  


 


11/5/18 19:19      Room Air  


 


11/5/18 19:00 98.3 76 17 173/99 (123) 94 Room Air  





 98.3       


 


11/5/18 18:09      Room Air  


 


11/5/18 15:00 99.1 86 16 160/101 (120) 93 Room Air  





 99.1       








I & O











Intake and Output 


 


 11/6/18





 07:00


 


Intake Total 900 ml


 


Output Total 0 ml


 


Balance 900 ml


 


 


 


Intake Oral 900 ml


 


Output Urine Total 0 ml


 


# Voids 3











PHYSICAL EXAM


Physical Exam


A&O, appropriate


LE's DTR's 1+/4 bilat


Motor 4/5 left; 5/5 rt





ASSESSMENT/PLAN


Assessment/Plan


MRI reviewed with Pt. & spouse


Plan LESI today


May F/U as OP











CRESCENCIO ROSSI MD Nov 6, 2018 12:59

## 2018-11-06 NOTE — PN
DATE:  11/06/2018



SUBJECTIVE:  The patient has continued pain, what appears to be maybe an L2 or

L3 left radicular pain and perhaps some sacroiliitis as well.  Sed rate was

normal.  Blood pressure continues to be mildly elevated with pain behavior. 

Gabapentin trial was started, but somehow stopped, so we will resume that.  I

agree with the [facility] consult with Dr. Kumar as I think he may well have

both an upper lumbar radicular pattern of pain somewhat unusual and also some

sacroiliac joint pain.  They are willing to consider epidural injections if Dr. Kumar recommends after reviewing the anatomic studies and physical exam as the

other option at this point as medications including steroids have been of no

benefit.

 



______________________________

BRENDON SIFUENTES MD



DR:  [email or web address]  JOB#:  [id number] / [id number]

DD:  11/06/2018 08:32  DT:  11/06/2018 21:13

## 2018-11-07 VITALS — SYSTOLIC BLOOD PRESSURE: 168 MMHG | DIASTOLIC BLOOD PRESSURE: 106 MMHG

## 2018-11-07 VITALS — SYSTOLIC BLOOD PRESSURE: 154 MMHG | DIASTOLIC BLOOD PRESSURE: 78 MMHG

## 2018-11-07 VITALS — DIASTOLIC BLOOD PRESSURE: 78 MMHG | SYSTOLIC BLOOD PRESSURE: 154 MMHG

## 2018-11-07 RX ADMIN — LOSARTAN POTASSIUM SCH MG: 50 TABLET ORAL at 08:39

## 2018-11-07 RX ADMIN — SENNOSIDES AND DOCUSATE SODIUM SCH TAB: 8.6; 5 TABLET ORAL at 08:44

## 2018-11-07 RX ADMIN — Medication SCH EA: at 08:43

## 2018-11-07 NOTE — PDOC
PROGRESS NOTES


Subjective


Subjective


He admits not much help with lumbar epidural steroid injection done yesterday.





Objective


Objective





Vital Signs








  Date Time  Temp Pulse Resp B/P (MAP) Pulse Ox O2 Delivery O2 Flow Rate FiO2


 


11/7/18 08:40  83  154/78    


 


11/7/18 08:30      Room Air  


 


11/7/18 07:00 98.3  16  96   





 98.3       














Intake and Output 


 


 11/7/18





 07:00


 


Intake Total 1880 ml


 


Output Total 600 ml


 


Balance 1280 ml


 


 


 


Intake Oral 1880 ml


 


Output Urine Total 600 ml


 


# Voids 1











Physical Exam


Physical Exam


He is lying in bed on his left side and he continues to protect his left 

buttock area and he had tenderness to palpation over left sacroiliac joint,

trochanteric bursa,hip adductor tendon attachment to pubic tubercle and gluteal 

muscles.He had significant increased pain with any movement of left hip area. 

He continues with hypertension.





Assessment


Assessment


Problems


Medical Problems:


(1) Accelerated hypertension


Status: Acute  





(2) Intractable low back pain


Status: Acute  











Plan


Plan of Care


I will be glad to consider injecting his left sacroiliac joint and trochanteric 

bursa when his hypertension is under good control.





Comment


Review of Relevant


I have reviewed the following items isabel (where applicable) has been applied.


Medications





Current Medications


Morphine Sulfate (Morphine Sulfate) 10 mg 1X  ONCE SQ  Last administered on 11/1 /18at 14:32;  Start 11/1/18 at 14:15;  Stop 11/1/18 at 14:20;  Status DC


Diazepam (Valium) 5 mg 1X  ONCE PO  Last administered on 11/1/18at 14:34;  

Start 11/1/18 at 14:15;  Stop 11/1/18 at 14:20;  Status DC


Ketorolac Tromethamine (Toradol Im) 60 mg 1X  ONCE IM  Last administered on 11/1 /18at 14:31;  Start 11/1/18 at 14:15;  Stop 11/1/18 at 14:20;  Status DC


Dexamethasone Sodium Phosphate (Decadron) 10 mg 1X  ONCE IM  Last administered 

on 11/1/18at 14:29;  Start 11/1/18 at 14:15;  Stop 11/1/18 at 14:20;  Status DC


Ondansetron HCl (Zofran) 4 mg PRN Q8HRS  PRN IV NAUSEA/VOMITING;  Start 11/1/18 

at 16:30;  Stop 11/2/18 at 16:29;  Status DC


Morphine Sulfate (Morphine Sulfate) 4 mg PRN Q2HR  PRN IV PAIN Last 

administered on 11/2/18at 06:49;  Start 11/1/18 at 16:30;  Stop 11/2/18 at 08:41

;  Status DC


Clonidine HCl (Catapres) 0.2 mg 1X  ONCE PO ;  Start 11/1/18 at 16:30;  Stop 11/ 1/18 at 16:39;  Status DC


Clonidine HCl (Catapres) 0.1 mg PRN Q6HRS  PRN PO HYPERTENSION, SEE COMMENTS 

Last administered on 11/1/18at 22:48;  Start 11/1/18 at 16:30;  Stop 11/2/18 at 

08:41;  Status DC


Iohexol (Omnipaque 240 Mg/ml) 30 ml 1X  ONCE PO  Last administered on 11/2/18at 

08:45;  Start 11/2/18 at 08:45;  Stop 11/2/18 at 08:46;  Status DC


Iohexol (Omnipaque 300 Mg/ml) 75 ml 1X  ONCE IV  Last administered on 11/2/18at 

08:45;  Start 11/2/18 at 08:45;  Stop 11/2/18 at 08:46;  Status DC


Amlodipine Besylate (Norvasc) 5 mg DAILY PO  Last administered on 11/5/18at 08:

17;  Start 11/2/18 at 09:00;  Stop 11/6/18 at 08:34;  Status DC


Acetaminophen/ Hydrocodone Bitart (Lortab 5/325) 1 tab PRN Q6HRS  PRN PO PAIN 

Last administered on 11/3/18at 19:23;  Start 11/2/18 at 08:45;  Stop 11/5/18 at 

17:11;  Status DC


Prednisone (Prednisone) 10 mg DAILY PO  Last administered on 11/4/18at 09:37;  

Start 11/2/18 at 09:00;  Stop 11/4/18 at 15:51;  Status DC


Losartan Potassium (Cozaar) 100 mg DAILY PO  Last administered on 11/7/18at 08:

39;  Start 11/2/18 at 09:00


Info (CONTRAST GIVEN -- Rx MONITORING) 1 each PRN DAILY  PRN MC SEE COMMENTS;  

Start 11/2/18 at 08:45;  Stop 11/4/18 at 08:44;  Status DC


Hydrochlorothiazide (Microzide) 12.5 mg DAILY PO  Last administered on 11/4/ 18at 09:36;  Start 11/2/18 at 09:00;  Stop 11/4/18 at 15:51;  Status DC


Ketorolac Tromethamine (Toradol 30mg Vial) 15 mg PRN Q6HRS  PRN IV PAIN Last 

administered on 11/4/18at 01:43;  Start 11/2/18 at 08:45;  Stop 11/7/18 at 08:44

;  Status DC


Methylprednisolone Sodium Succinate (SOLU-Medrol 125MG VIAL) 125 mg 1X  ONCE IV

  Last administered on 11/4/18at 11:53;  Start 11/4/18 at 12:15;  Stop 11/4/18 

at 12:16;  Status DC


Celecoxib (CeleBREX) 200 mg BID PO  Last administered on 11/5/18at 08:16;  

Start 11/4/18 at 12:00;  Stop 11/5/18 at 17:11;  Status DC


Methylprednisolone Acetate (DEPO-Medrol 40MG VIAL) 40 mg 1X  ONCE IM ;  Start 11 /5/18 at 07:45;  Stop 11/5/18 at 08:05;  Status DC


Bupivacaine HCl (Sensorcaine-Mpf 0.25%) 10 ml 1X  ONCE IJ ;  Start 11/5/18 at 07

:45;  Stop 11/5/18 at 08:05;  Status DC


Methylprednisolone Acetate (DEPO-Medrol 40MG VIAL) 40 mg 1X  ONCE IM ;  Start 11 /5/18 at 07:45;  Stop 11/5/18 at 08:05;  Status DC


Prednisone (Prednisone) 10 mg DAILY PO  Last administered on 11/5/18at 08:16;  

Start 11/5/18 at 09:00;  Stop 11/5/18 at 09:00;  Status DC


Gabapentin (Neurontin) 200 mg TID PO  Last administered on 11/5/18at 14:32;  

Start 11/5/18 at 09:00;  Stop 11/5/18 at 17:11;  Status DC


Oxycodone/ Acetaminophen (Percocet 10/325) 1 tab PRN Q6HRS  PRN PO PAIN Last 

administered on 11/5/18at 18:09;  Start 11/5/18 at 17:15;  Stop 11/7/18 at 08:40

;  Status DC


Lidocaine (Lidoderm) 2 patch QHS TD  Last administered on 11/5/18at 18:10;  

Start 11/5/18 at 18:00;  Stop 11/7/18 at 08:40;  Status DC


Bisacodyl (Dulcolax Tab) 10 mg PRN DAILY  PRN PO CONSTIPATION;  Start 11/5/18 

at 17:15


Magnesium Hydroxide (Milk Of Magnesia) 2,400 mg PRN DAILY  PRN PO CONSTIPATION 

Last administered on 11/6/18at 08:44;  Start 11/5/18 at 17:15


Senna/Docusate Sodium (Senna Plus) 1 tab BID PO  Last administered on 11/6/18at 

08:44;  Start 11/5/18 at 21:00


Miscellaneous (Lidoderm Patch Removal) 1 ea DAILY MC  Last administered on 11/6/ 18at 08:45;  Start 11/6/18 at 09:00


Gabapentin (Neurontin) 200 mg TID PO ;  Start 11/6/18 at 09:00;  Stop 11/7/18 

at 08:40;  Status DC


Amlodipine Besylate (Norvasc) 10 mg DAILY PO  Last administered on 11/7/18at 08:

40;  Start 11/6/18 at 09:00


Methylprednisolone Acetate (DEPO-Medrol 40MG VIAL) 40 mg STK-MED ONCE .ROUTE ;  

Start 11/6/18 at 10:30;  Stop 11/6/18 at 10:31;  Status DC


Iohexol (Omnipaque 180 Mg/ml) 10 ml STK-MED ONCE .ROUTE ;  Start 11/6/18 at 10:

30;  Stop 11/6/18 at 10:31;  Status DC


Methylprednisolone Acetate (DEPO-Medrol 80MG VIAL) 80 mg STK-MED ONCE .ROUTE ;  

Start 11/6/18 at 10:30;  Stop 11/6/18 at 10:31;  Status DC





Active Scripts


Active


Reported


Hydrocodone-Apap 5-325  ** (Hydrocodone Bit/Acetaminophen) 1 Each Tablet 1 Tab 

PO PRN Q6HRS PRN


Prednisone 20 Mg Tablet 0.5 Tab PO DAILY


Amlodipine Besylate 5 Mg Tablet 5 Mg PO DAILY


Losartan-Hctz 100-12.5 Mg Tab (Losartan/Hydrochlorothiazide) 1 Each Tablet 1 

Each PO DAILY


Vitals/I & O





Vital Sign - Last 24 Hours








 11/6/18 11/6/18 11/6/18 11/6/18





 10:51 11:09 14:45 19:00


 


Temp 97.8 98.7 99.0 99.2





 97.8 98.7 99.0 99.2


 


Pulse 76 86 94 86


 


Resp 20 18 12 20


 


B/P (MAP) 187/89 (121) 176/111 (132) 151/87 (108) 170/104 (126)


 


Pulse Ox 93 94 95 97


 


O2 Delivery Room Air Room Air Room Air Room Air


 


    





    





 11/6/18 11/7/18 11/7/18 11/7/18





 23:00 03:00 07:00 08:30


 


Temp 99.0 99.0 98.3 





 99.0 99.0 98.3 


 


Pulse 86 88 83 


 


Resp 20 20 16 


 


B/P (MAP) 152/94 (113) 168/106 (126) 154/78 (103) 


 


Pulse Ox 94 95 96 


 


O2 Delivery Room Air Room Air Room Air Room Air


 


    





    





 11/7/18 11/7/18  





 08:39 08:40  


 


Pulse 83 83  


 


B/P (MAP) 154/78 154/78  














Intake and Output   


 


 11/6/18 11/6/18 11/7/18





 15:00 23:00 07:00


 


Intake Total 600 ml 1080 ml 200 ml


 


Output Total  600 ml 


 


Balance 600 ml 480 ml 200 ml

















BRYANNA GUTIÉRREZ MD Nov 7, 2018 09:56

## 2018-11-07 NOTE — DISCH
DISCHARGE INSTRUCTIONS


Condition on Discharge


Condition on Discharge:  Stable





Activity After Discharge


Activity Instructions for Disc:  No restrictions





Diet after Discharge


Diet after Discharge:  Low Sodium 4 gm





Follow-Up


Follow up with:  as scheduled











BRENDON SIFUENTES MD Nov 7, 2018 08:39

## 2019-03-31 NOTE — RAD
CT of the abdomen and pelvis with contrast, 11/2/2018:

 

HISTORY: Abdominal and pelvic pain

 

Multidetector CT imaging was performed following oral and IV 

administration of contrast.

 

The heart is mildly enlarged.

 

There are 2 tiny subcentimeter low-density lesions in the right lobe of 

the liver these are too small to definitively characterize but are 

probably cysts or hepatic hamartomas. The gallbladder is unremarkable. No 

pancreatic abnormality is seen. The spleen is of normal size. A 2.1 cm 

cyst is present in the left kidney. The kidneys are otherwise 

unremarkable.

 

Aortoiliac calcific plaquing is present without evidence of aneurysm. No 

abdominal or pelvic adenopathy is seen.

 

Several sigmoid diverticula are noted without evidence of paracolic 

inflammation. The appendix is visualized and it shows no abnormality. The 

bowel loops are not dilated. No free fluid or free air is evident in the 

abdomen or pelvis.

 

A small fat-containing umbilical hernia is present. No bowel herniation is

evident.

 

There is a moderate thoracic lumbar scoliosis with moderate multilevel 

degenerative change.

 

IMPRESSION:

1. Probable small hepatic cysts.

2. Small left renal cyst.

3. Minimal sigmoid diverticulosis.

4. Small fat-containing umbilical hernia.

5. No acute abdominal or pelvic abnormality is detected.

 

 

 

[employer] Compliance Statement:

 

One or more of the following individualized dose reduction techniques were

utilized for this examination:  

1. Automated exposure control  

2. Adjustment of the mA and/or kV according to patient size  

3. Use of iterative reconstruction technique

 

 

Electronically signed by: Rick Moritz, MD (11/2/2018 1:33 PM) [facility] ambulatory

## 2019-09-20 NOTE — DS
DATE OF DISCHARGE:  11/07/2018



HOSPITAL SUMMARY:  A 63-year-old black male with ongoing pain in his left lower

back, radiating to the left groin area and inguinal area for a few weeks.  CBC,

sed rate, chemistry profile, and urinalysis were all unremarkable.  CT scan of

the abdomen and pelvis was unremarkable except for small cyst in the left kidney

and liver.  Films of the pelvis and left hip were all unremarkable as well. 

Lumbar spine MRI showed degenerative changes throughout the lumbar spine with

mild left lateral central spinal stenosis at L1-L2 and L2-L3 that could fit the

pattern of his pain being in L2 or L3 distribution.  Dr. Madrid had attempted to

do sacroiliac joint injection, but the patient did not tolerate and this was not

done.  Dr. Kumar saw him in the pain clinic and performed epidural steroid

injection uneventfully, but the patient has had no pain relief from this.  He

has declined to take gabapentin and failed to respond to the steroids and

Celebrex and opioids.  He plans to go to [facility] now to get a second opinion

regarding his ongoing pain as it is even possible that this could be a surgical

problem in the upper lumbar spine.  All questions were answered and copies of

lab and the disk of MRI will be given to him to take with him for their

evaluation.



FINAL DIAGNOSES:  Intractable left lower back and groin pain, likely secondary

to L2 or L3 radiculopathy, left side.



OPERATIONS AND PROCEDURES:  Lumbar epidural steroid injection.



COMPLICATIONS:  None.



CONSULTATIONS:  Dr. Noble of Urology, Dr. Madrid and Dr. Christiano Kumar.



DISPOSITION:  Home meds remain the same.  He will take no new medications as he

did not tolerate the new meds or respond to them.  He plans to go to [facility] for

second opinion at this point.  He is safe ambulatory.



PROGNOSIS:  Uncertain.

 



______________________________

BRENDON SIFUENTES MD



DR:  ALECIA/[email or web address]  JOB#:  [id number] / [id number]

DD:  11/07/2018 08:43  DT:  11/07/2018 14:38 No
